# Patient Record
Sex: MALE | Race: OTHER | URBAN - METROPOLITAN AREA
[De-identification: names, ages, dates, MRNs, and addresses within clinical notes are randomized per-mention and may not be internally consistent; named-entity substitution may affect disease eponyms.]

---

## 2017-07-06 ENCOUNTER — EMERGENCY (EMERGENCY)
Facility: HOSPITAL | Age: 37
LOS: 1 days | Discharge: HOME | End: 2017-07-06
Admitting: EMERGENCY MEDICINE
Payer: SELF-PAY

## 2017-07-06 DIAGNOSIS — R11.2 NAUSEA WITH VOMITING, UNSPECIFIED: ICD-10-CM

## 2017-07-06 DIAGNOSIS — S83.206A UNSPECIFIED TEAR OF UNSPECIFIED MENISCUS, CURRENT INJURY, RIGHT KNEE, INITIAL ENCOUNTER: Chronic | ICD-10-CM

## 2017-07-06 DIAGNOSIS — K52.9 NONINFECTIVE GASTROENTERITIS AND COLITIS, UNSPECIFIED: ICD-10-CM

## 2017-07-06 PROCEDURE — 36415 COLL VENOUS BLD VENIPUNCTURE: CPT

## 2017-07-06 PROCEDURE — 85027 COMPLETE CBC AUTOMATED: CPT

## 2017-07-06 PROCEDURE — 80053 COMPREHEN METABOLIC PANEL: CPT

## 2017-07-06 PROCEDURE — 99284 EMERGENCY DEPT VISIT MOD MDM: CPT

## 2017-07-06 PROCEDURE — 96374 THER/PROPH/DIAG INJ IV PUSH: CPT

## 2017-07-06 PROCEDURE — 96375 TX/PRO/DX INJ NEW DRUG ADDON: CPT

## 2017-07-06 PROCEDURE — 83690 ASSAY OF LIPASE: CPT

## 2017-07-06 PROCEDURE — 99284 EMERGENCY DEPT VISIT MOD MDM: CPT | Mod: 25

## 2018-04-17 ENCOUNTER — INPATIENT (INPATIENT)
Facility: HOSPITAL | Age: 38
LOS: 2 days | Discharge: HOME | End: 2018-04-20
Attending: INTERNAL MEDICINE | Admitting: INTERNAL MEDICINE
Payer: COMMERCIAL

## 2018-04-17 VITALS
RESPIRATION RATE: 18 BRPM | HEART RATE: 95 BPM | DIASTOLIC BLOOD PRESSURE: 70 MMHG | TEMPERATURE: 99 F | OXYGEN SATURATION: 94 % | SYSTOLIC BLOOD PRESSURE: 122 MMHG

## 2018-04-17 DIAGNOSIS — S83.206A UNSPECIFIED TEAR OF UNSPECIFIED MENISCUS, CURRENT INJURY, RIGHT KNEE, INITIAL ENCOUNTER: Chronic | ICD-10-CM

## 2018-04-17 LAB
ALBUMIN SERPL ELPH-MCNC: 4.5 G/DL — SIGNIFICANT CHANGE UP (ref 3.3–5)
ALP SERPL-CCNC: 90 U/L — SIGNIFICANT CHANGE UP (ref 40–120)
ALT FLD-CCNC: 61 U/L — HIGH (ref 4–41)
AST SERPL-CCNC: 31 U/L — SIGNIFICANT CHANGE UP (ref 4–40)
BASE EXCESS BLDV CALC-SCNC: 0.5 MMOL/L — SIGNIFICANT CHANGE UP
BASOPHILS # BLD AUTO: 0.01 K/UL — SIGNIFICANT CHANGE UP (ref 0–0.2)
BASOPHILS NFR BLD AUTO: 0.1 % — SIGNIFICANT CHANGE UP (ref 0–2)
BILIRUB SERPL-MCNC: 0.6 MG/DL — SIGNIFICANT CHANGE UP (ref 0.2–1.2)
BUN SERPL-MCNC: 14 MG/DL — SIGNIFICANT CHANGE UP (ref 7–23)
CALCIUM SERPL-MCNC: 9.5 MG/DL — SIGNIFICANT CHANGE UP (ref 8.4–10.5)
CHLORIDE SERPL-SCNC: 100 MMOL/L — SIGNIFICANT CHANGE UP (ref 98–107)
CO2 SERPL-SCNC: 21 MMOL/L — LOW (ref 22–31)
CREAT SERPL-MCNC: 1.01 MG/DL — SIGNIFICANT CHANGE UP (ref 0.5–1.3)
EOSINOPHIL # BLD AUTO: 0 K/UL — SIGNIFICANT CHANGE UP (ref 0–0.5)
EOSINOPHIL NFR BLD AUTO: 0 % — SIGNIFICANT CHANGE UP (ref 0–6)
GLUCOSE SERPL-MCNC: 131 MG/DL — HIGH (ref 70–99)
HCO3 BLDV-SCNC: 24 MMOL/L — SIGNIFICANT CHANGE UP (ref 20–27)
HCT VFR BLD CALC: 50.1 % — HIGH (ref 39–50)
HGB BLD-MCNC: 16.9 G/DL — SIGNIFICANT CHANGE UP (ref 13–17)
IMM GRANULOCYTES # BLD AUTO: 0.05 # — SIGNIFICANT CHANGE UP
IMM GRANULOCYTES NFR BLD AUTO: 0.5 % — SIGNIFICANT CHANGE UP (ref 0–1.5)
LYMPHOCYTES # BLD AUTO: 0.64 K/UL — LOW (ref 1–3.3)
LYMPHOCYTES # BLD AUTO: 6.8 % — LOW (ref 13–44)
MCHC RBC-ENTMCNC: 30.7 PG — SIGNIFICANT CHANGE UP (ref 27–34)
MCHC RBC-ENTMCNC: 33.7 % — SIGNIFICANT CHANGE UP (ref 32–36)
MCV RBC AUTO: 90.9 FL — SIGNIFICANT CHANGE UP (ref 80–100)
MONOCYTES # BLD AUTO: 0.33 K/UL — SIGNIFICANT CHANGE UP (ref 0–0.9)
MONOCYTES NFR BLD AUTO: 3.5 % — SIGNIFICANT CHANGE UP (ref 2–14)
NEUTROPHILS # BLD AUTO: 8.34 K/UL — HIGH (ref 1.8–7.4)
NEUTROPHILS NFR BLD AUTO: 89.1 % — HIGH (ref 43–77)
NRBC # FLD: 0 — SIGNIFICANT CHANGE UP
PCO2 BLDV: 39 MMHG — LOW (ref 41–51)
PH BLDV: 7.42 PH — SIGNIFICANT CHANGE UP (ref 7.32–7.43)
PLATELET # BLD AUTO: 157 K/UL — SIGNIFICANT CHANGE UP (ref 150–400)
PMV BLD: 11.9 FL — SIGNIFICANT CHANGE UP (ref 7–13)
PO2 BLDV: 44 MMHG — HIGH (ref 35–40)
POTASSIUM SERPL-MCNC: 4 MMOL/L — SIGNIFICANT CHANGE UP (ref 3.5–5.3)
POTASSIUM SERPL-SCNC: 4 MMOL/L — SIGNIFICANT CHANGE UP (ref 3.5–5.3)
PROT SERPL-MCNC: 8.3 G/DL — SIGNIFICANT CHANGE UP (ref 6–8.3)
RBC # BLD: 5.51 M/UL — SIGNIFICANT CHANGE UP (ref 4.2–5.8)
RBC # FLD: 13 % — SIGNIFICANT CHANGE UP (ref 10.3–14.5)
SAO2 % BLDV: 78 % — SIGNIFICANT CHANGE UP (ref 60–85)
SODIUM SERPL-SCNC: 138 MMOL/L — SIGNIFICANT CHANGE UP (ref 135–145)
WBC # BLD: 9.37 K/UL — SIGNIFICANT CHANGE UP (ref 3.8–10.5)
WBC # FLD AUTO: 9.37 K/UL — SIGNIFICANT CHANGE UP (ref 3.8–10.5)

## 2018-04-17 PROCEDURE — 71046 X-RAY EXAM CHEST 2 VIEWS: CPT | Mod: 26

## 2018-04-17 RX ORDER — SODIUM CHLORIDE 9 MG/ML
1000 INJECTION INTRAMUSCULAR; INTRAVENOUS; SUBCUTANEOUS ONCE
Qty: 0 | Refills: 0 | Status: COMPLETED | OUTPATIENT
Start: 2018-04-17 | End: 2018-04-17

## 2018-04-17 RX ORDER — IPRATROPIUM/ALBUTEROL SULFATE 18-103MCG
3 AEROSOL WITH ADAPTER (GRAM) INHALATION ONCE
Qty: 0 | Refills: 0 | Status: COMPLETED | OUTPATIENT
Start: 2018-04-17 | End: 2018-04-17

## 2018-04-17 RX ORDER — NICOTINE POLACRILEX 2 MG
1 GUM BUCCAL DAILY
Qty: 0 | Refills: 0 | Status: DISCONTINUED | OUTPATIENT
Start: 2018-04-17 | End: 2018-04-20

## 2018-04-17 RX ORDER — AZITHROMYCIN 500 MG/1
500 TABLET, FILM COATED ORAL ONCE
Qty: 0 | Refills: 0 | Status: COMPLETED | OUTPATIENT
Start: 2018-04-17 | End: 2018-04-17

## 2018-04-17 RX ORDER — DEXAMETHASONE 0.5 MG/5ML
8 ELIXIR ORAL ONCE
Qty: 0 | Refills: 0 | Status: COMPLETED | OUTPATIENT
Start: 2018-04-17 | End: 2018-04-17

## 2018-04-17 RX ORDER — KETOROLAC TROMETHAMINE 30 MG/ML
15 SYRINGE (ML) INJECTION ONCE
Qty: 0 | Refills: 0 | Status: DISCONTINUED | OUTPATIENT
Start: 2018-04-17 | End: 2018-04-17

## 2018-04-17 RX ADMIN — Medication 100 MILLIGRAM(S): at 21:03

## 2018-04-17 RX ADMIN — Medication 15 MILLIGRAM(S): at 19:19

## 2018-04-17 RX ADMIN — Medication 1 PATCH: at 22:15

## 2018-04-17 RX ADMIN — Medication 3 MILLILITER(S): at 18:34

## 2018-04-17 RX ADMIN — Medication 15 MILLIGRAM(S): at 19:40

## 2018-04-17 RX ADMIN — Medication 8 MILLIGRAM(S): at 21:03

## 2018-04-17 RX ADMIN — SODIUM CHLORIDE 1000 MILLILITER(S): 9 INJECTION INTRAMUSCULAR; INTRAVENOUS; SUBCUTANEOUS at 19:19

## 2018-04-17 RX ADMIN — AZITHROMYCIN 500 MILLIGRAM(S): 500 TABLET, FILM COATED ORAL at 21:02

## 2018-04-17 NOTE — ED PROVIDER NOTE - RESPIRATORY NEGATIVE STATEMENT, MLM
no chest pain, no cough, and no shortness of breath. no chest pain, no cough, and +shortness of breath.

## 2018-04-17 NOTE — ED CDU PROVIDER INITIAL DAY NOTE - ATTENDING CONTRIBUTION TO CARE
I performed a face to face bedside interview with patient regarding history of present illness, review of symptoms and past medical history. I completed an independent physical exam.  I have discussed patient's plan of care.   I agree with note as stated above, having amended the EMR as needed to reflect my findings. I have discussed the assessment and plan of care.  This includes during the time I functioned as the attending physician for this patient.  Attending Contribution to Care: agree with plan of pa. pt p/w wheezing, uri. febrile. stable for cdu

## 2018-04-17 NOTE — ED ADULT TRIAGE NOTE - CHIEF COMPLAINT QUOTE
pt brought from [facility] c/o diff breathing x 3 days, hx asthma (no hx of intubation), dx w/ bronchitis today received prednisone zofran motrin by [facility], duo nebs by EMS, refusing nasal cannula on presentation, able to complete sentences, states feels much better, tmax @ home 103

## 2018-04-17 NOTE — ED CDU PROVIDER INITIAL DAY NOTE - OBJECTIVE STATEMENT
38 yo M pmhx of HTN ( no meds ), COPD (1 hospitalization, no intubations, albuterol PRN last use 1 mo ago) here for cough, congestion, fever, and wheezing x 2 days. pt reports went to UC this morning for symptoms and given nebs with little improvement, found to have fever of 103F and told to go to ER. Took motrin and fever resolved. Reports no albuterol at home (last use 1 mo ago). States "I typically just sign my self out when they tell me to stay overnight" however patient is ammendable to staying for obs. Denies CP HA dizziness neck pain abdominal pain rash weakness. Currently 2 ppd smoker.

## 2018-04-17 NOTE — ED PROVIDER NOTE - OBJECTIVE STATEMENT
38 y/o m with pmhx of chronic bronchitis, copd with hx of admissions never intubated p/w 36 y/o m with pmhx of chronic bronchitis, copd with hx of admissions never intubated p/w cough, sob. fever, 38 y/o m with pmhx of chronic bronchitis, copd with hx of admissions never intubated p/w cough, sob. fever x 2d.

## 2018-04-17 NOTE — ED CDU PROVIDER INITIAL DAY NOTE - MEDICAL DECISION MAKING DETAILS
36 yo M here for cough, congestion, fever, wheezing x a few days. improved in ED with nebs and steroids. labs reviewed, cxr clear. Plan for CDU for continued nebs, fluids, steroids, and azithro.

## 2018-04-17 NOTE — ED ADULT NURSE REASSESSMENT NOTE - NS ED NURSE REASSESS COMMENT FT1
rec'd pt. a&ox3, appears in NAD at this time, respirations unlabored, congested, speaks clearly and in full sentences. pt. with g20 saline lock on rt ac with no ss of infiltration. pt. reports breathing improved and pain is less at this time. meds given as ordered. pt. admitted to CDU. report given to BRENDAN Santiago in CDU.

## 2018-04-18 DIAGNOSIS — J44.1 CHRONIC OBSTRUCTIVE PULMONARY DISEASE WITH (ACUTE) EXACERBATION: ICD-10-CM

## 2018-04-18 LAB
B PERT DNA SPEC QL NAA+PROBE: SIGNIFICANT CHANGE UP
C PNEUM DNA SPEC QL NAA+PROBE: NOT DETECTED — SIGNIFICANT CHANGE UP
FLUAV H1 2009 PAND RNA SPEC QL NAA+PROBE: NOT DETECTED — SIGNIFICANT CHANGE UP
FLUAV H1 RNA SPEC QL NAA+PROBE: NOT DETECTED — SIGNIFICANT CHANGE UP
FLUAV H3 RNA SPEC QL NAA+PROBE: NOT DETECTED — SIGNIFICANT CHANGE UP
FLUAV SUBTYP SPEC NAA+PROBE: SIGNIFICANT CHANGE UP
FLUBV RNA SPEC QL NAA+PROBE: NOT DETECTED — SIGNIFICANT CHANGE UP
HADV DNA SPEC QL NAA+PROBE: NOT DETECTED — SIGNIFICANT CHANGE UP
HCOV 229E RNA SPEC QL NAA+PROBE: NOT DETECTED — SIGNIFICANT CHANGE UP
HCOV HKU1 RNA SPEC QL NAA+PROBE: NOT DETECTED — SIGNIFICANT CHANGE UP
HCOV NL63 RNA SPEC QL NAA+PROBE: NOT DETECTED — SIGNIFICANT CHANGE UP
HCOV OC43 RNA SPEC QL NAA+PROBE: NOT DETECTED — SIGNIFICANT CHANGE UP
HMPV RNA SPEC QL NAA+PROBE: POSITIVE — HIGH
HPIV1 RNA SPEC QL NAA+PROBE: NOT DETECTED — SIGNIFICANT CHANGE UP
HPIV2 RNA SPEC QL NAA+PROBE: NOT DETECTED — SIGNIFICANT CHANGE UP
HPIV3 RNA SPEC QL NAA+PROBE: NOT DETECTED — SIGNIFICANT CHANGE UP
HPIV4 RNA SPEC QL NAA+PROBE: NOT DETECTED — SIGNIFICANT CHANGE UP
M PNEUMO DNA SPEC QL NAA+PROBE: NOT DETECTED — SIGNIFICANT CHANGE UP
RSV RNA SPEC QL NAA+PROBE: NOT DETECTED — SIGNIFICANT CHANGE UP
RV+EV RNA SPEC QL NAA+PROBE: NOT DETECTED — SIGNIFICANT CHANGE UP

## 2018-04-18 PROCEDURE — 99223 1ST HOSP IP/OBS HIGH 75: CPT | Mod: GC

## 2018-04-18 RX ORDER — IBUPROFEN 200 MG
600 TABLET ORAL
Qty: 0 | Refills: 0 | Status: DISCONTINUED | OUTPATIENT
Start: 2018-04-18 | End: 2018-04-20

## 2018-04-18 RX ORDER — IPRATROPIUM/ALBUTEROL SULFATE 18-103MCG
3 AEROSOL WITH ADAPTER (GRAM) INHALATION ONCE
Qty: 0 | Refills: 0 | Status: COMPLETED | OUTPATIENT
Start: 2018-04-18 | End: 2018-04-18

## 2018-04-18 RX ORDER — IBUPROFEN 200 MG
400 TABLET ORAL
Qty: 0 | Refills: 0 | Status: DISCONTINUED | OUTPATIENT
Start: 2018-04-18 | End: 2018-04-18

## 2018-04-18 RX ORDER — ALBUTEROL 90 UG/1
2.5 AEROSOL, METERED ORAL EVERY 4 HOURS
Qty: 0 | Refills: 0 | Status: DISCONTINUED | OUTPATIENT
Start: 2018-04-18 | End: 2018-04-18

## 2018-04-18 RX ORDER — ALBUTEROL 90 UG/1
2 AEROSOL, METERED ORAL EVERY 6 HOURS
Qty: 0 | Refills: 0 | Status: DISCONTINUED | OUTPATIENT
Start: 2018-04-18 | End: 2018-04-19

## 2018-04-18 RX ORDER — IPRATROPIUM/ALBUTEROL SULFATE 18-103MCG
3 AEROSOL WITH ADAPTER (GRAM) INHALATION EVERY 6 HOURS
Qty: 0 | Refills: 0 | Status: DISCONTINUED | OUTPATIENT
Start: 2018-04-18 | End: 2018-04-19

## 2018-04-18 RX ORDER — ALBUTEROL 90 UG/1
2.5 AEROSOL, METERED ORAL ONCE
Qty: 0 | Refills: 0 | Status: COMPLETED | OUTPATIENT
Start: 2018-04-18 | End: 2018-04-18

## 2018-04-18 RX ADMIN — Medication 100 MILLIGRAM(S): at 22:31

## 2018-04-18 RX ADMIN — Medication 3 MILLILITER(S): at 07:08

## 2018-04-18 RX ADMIN — Medication 3 MILLILITER(S): at 06:57

## 2018-04-18 RX ADMIN — Medication 600 MILLIGRAM(S): at 18:14

## 2018-04-18 RX ADMIN — Medication 3 MILLILITER(S): at 22:11

## 2018-04-18 RX ADMIN — Medication 100 MILLIGRAM(S): at 18:13

## 2018-04-18 RX ADMIN — ALBUTEROL 2.5 MILLIGRAM(S): 90 AEROSOL, METERED ORAL at 06:41

## 2018-04-18 RX ADMIN — Medication 1 PATCH: at 08:29

## 2018-04-18 RX ADMIN — ALBUTEROL 2.5 MILLIGRAM(S): 90 AEROSOL, METERED ORAL at 13:14

## 2018-04-18 RX ADMIN — Medication 3 MILLILITER(S): at 06:33

## 2018-04-18 RX ADMIN — Medication 600 MILLIGRAM(S): at 19:00

## 2018-04-18 RX ADMIN — Medication 40 MILLIGRAM(S): at 13:14

## 2018-04-18 NOTE — H&P ADULT - NSHPPHYSICALEXAM_GEN_ALL_CORE
PHYSICAL EXAM:  General:  AAOx3  HEENT: EOMI, PERRL  Lymph Nodes: No LAD  Neck: JVP 4-6cm  Respiratory: diffuse end-expiratory wheezing  Cardiovascular: RRR, no m/r/g  Abdomen: soft, NT/ND, no HSM  Extremities: no c/c/e  Skin: nl. skin turgor  Neurological: no deficitis

## 2018-04-18 NOTE — ED CDU PROVIDER DISPOSITION NOTE - CLINICAL COURSE
38yo M hx of HTN, COPD, smoker, pw productive cough, sob, wheezing and fever x 2 days.  +SOB, +productive cough +subjective temp, chills. no chest pain. Using neb at home w  no relief. Denies any prior intubation or ICU admission. Hasn't follow up with pulmonary in couple years.   Vital signs noted. +audible expiratory wheezing bl no retractions. talking in full sentences. normal S1-S2 AOx3  plan admit to PCU, rvp, continue neb treatment, steroids, peak flow and IV abx,  pt agreeable to plan.

## 2018-04-18 NOTE — H&P ADULT - HISTORY OF PRESENT ILLNESS
36 y/o M with PMH of asthma presented to the hospital with chief complaint of shortness of breath.    Patient states, his sxs began 3 days prior to arrival.  He started having sinus congestion and cough with sputum production.  Denied fevers and sick contacts.    Re: h/o asthma and has not been on inhalers.  Saw Dr. MEL Avila 3 years ago but has been stable on no controller meds.  Smoker of 1 PPD for 15 years, but states he quit 2 days PTA.

## 2018-04-18 NOTE — H&P ADULT - ASSESSMENT
36 y/o M with PMH of asthma presented with acute exacerbation.    1.  Acute Asthma Exacerbation-  -Etiology appears related to a viral source given constellation of symptoms  -Clinically patient appears stable but wheezing on physical exam  -Given nebs and steroids in the ED  -Start Duonebs q4 ATC  -Medrol 40mg IV QD  -Cough suppressants  -Supplemental oxygen as needed  -Patient with no fevers/wbc count/cxr clear--no role for anti-microbials at this time    2. FEN-  -House diet    3. PPx-  -Lovenox  Ethan Soliz DO  Pulmonary and Critical Care Fellow

## 2018-04-18 NOTE — H&P ADULT - NSHPLABSRESULTS_GEN_ALL_CORE
LABS:                        16.9   9.37  )-----------( 157      ( 17 Apr 2018 19:25 )             50.1     04-17    138  |  100  |  14  ----------------------------<  131<H>  4.0   |  21<L>  |  1.01    Ca    9.5      17 Apr 2018 19:25    TPro  8.3  /  Alb  4.5  /  TBili  0.6  /  DBili  x   /  AST  31  /  ALT  61<H>  /  AlkPhos  90  04-17          Venous Blood Gas:  04-17 @ 19:25  7.42/39/44/24/78.0  VBG Lactate: --    RADIOLOGY:  [x] Reviewed and interpreted by me

## 2018-04-18 NOTE — SBIRT NOTE. - NSSBIRTSERVICES_GEN_A_ED_FT
Provided SBIRT services: Full screen Negative. Positive reinforcement provided given patient currently within healthy guidelines.   Audit Score: 0  DAST Score: 0  Duration = # 7 Minutes

## 2018-04-18 NOTE — H&P ADULT - NSHPREVIEWOFSYSTEMS_GEN_ALL_CORE
REVIEW OF SYSTEMS:  Constitutional:   Eyes:  ENT:  CV:  Resp: sob/cough  GI:  :  MSK:  Integumentary:  Neurological:  Psychiatric:  Endocrine:  Hematologic/Lymphatic:  Allergic/Immunologic:  [x] All other systems negative  [ ] Unable to assess ROS because ________

## 2018-04-18 NOTE — ED CDU PROVIDER SUBSEQUENT DAY NOTE - MEDICAL DECISION MAKING DETAILS
Pt is a 38 y/o M smoker PMHx HTN, COPD (pt states asthma or chronic bronchitis, no intubation, one hospitalization) p/w cough, fever, wheezing x 2 days -- bronchitis, copd exacerbation -- nebs, steroids, PCU admit

## 2018-04-18 NOTE — ED CDU PROVIDER SUBSEQUENT DAY NOTE - PROGRESS NOTE DETAILS
pt doing well, no further complaints, sleeping comfortable. Will continue to monitor for repeat nebs. Plan to d/c home with zpack. Pt now awake, coughing, reports "why did I stay overnight I want to sign out". Discussed with patient that he was being monitored overnight for wheezing or difficulty breathing. Pt reports that he wants to sign out of the unit. Offered patient nebulizer this morning as it has been a while since last neb as he was sleeping comfortable and did not require treatments overnight. Pt agrees for neb this morning. Will reassess. Pt admitted to PCU, Dr. LIsker.  Pt does not have a PMD at this time.

## 2018-04-18 NOTE — ED CDU PROVIDER SUBSEQUENT DAY NOTE - ATTENDING CONTRIBUTION TO CARE
Attending Statement: I have reviewed and agree with all pertinent clinical information, including history and physical exam and agree with treatment plan of the PA, except as noted.  38yo M hx of HTN, COPD, smoker, pw productive cough, sob, wheezing and fever x 2 days.  +SOB, +productive cough +subjective temp, chills. no chest pain. Using neb at home w  no relief. Denies any prior intubation or ICU admission. Hasn't follow up with pulmonary in couple years.   Vital signs noted. +audible expiratory wheezing bl no retractions. talking in full sentences. normal S1-S2 AOx3  plan admit to PCU, rvp, continue neb treatment, steroids, peak flow and IV abx,  pt agreeable to plan.

## 2018-04-18 NOTE — ED CDU PROVIDER SUBSEQUENT DAY NOTE - HISTORY
Pt is a 36 y/o M smoker PMHx HTN, COPD (pt states asthma or chronic bronchitis, no intubation, one hospitalization) p/w cough, fever, wheezing x 2 days.  Pt notes similar to COPD exacerbation in past.  Denies chest pain, numbness, weakness, syncope, palpitations.  In ED, pt with normal CXR.  Pt sent to CDU for bronchitis, copd exacerbation.  Presently, pt states symptoms not significantly improved.  At times, difficulty walking to bathroom.

## 2018-04-18 NOTE — ED CDU PROVIDER DISPOSITION NOTE - NS ED MD EM DAY 1
FINAL REPORT



EXAM:  XR CHEST ROUTINE 2V



HISTORY:  Shortness of breath 



TECHNIQUE:  PA and lateral views of the chest were submitted.



FINDINGS:  

The heart size is at the upper limits of normal. The lungs are

clear. Pleural fluid is not seen. The bones and soft tissues

appear normal.



IMPRESSION:  

No active chest disease. Observation Stay Multiple Days

## 2018-04-19 ENCOUNTER — TRANSCRIPTION ENCOUNTER (OUTPATIENT)
Age: 38
End: 2018-04-19

## 2018-04-19 DIAGNOSIS — J44.1 CHRONIC OBSTRUCTIVE PULMONARY DISEASE WITH (ACUTE) EXACERBATION: ICD-10-CM

## 2018-04-19 DIAGNOSIS — J41.0 SIMPLE CHRONIC BRONCHITIS: ICD-10-CM

## 2018-04-19 DIAGNOSIS — J45.21 MILD INTERMITTENT ASTHMA WITH (ACUTE) EXACERBATION: ICD-10-CM

## 2018-04-19 LAB
BUN SERPL-MCNC: 18 MG/DL — SIGNIFICANT CHANGE UP (ref 7–23)
CALCIUM SERPL-MCNC: 8.5 MG/DL — SIGNIFICANT CHANGE UP (ref 8.4–10.5)
CHLORIDE SERPL-SCNC: 106 MMOL/L — SIGNIFICANT CHANGE UP (ref 98–107)
CO2 SERPL-SCNC: 23 MMOL/L — SIGNIFICANT CHANGE UP (ref 22–31)
CREAT SERPL-MCNC: 0.79 MG/DL — SIGNIFICANT CHANGE UP (ref 0.5–1.3)
GLUCOSE SERPL-MCNC: 119 MG/DL — HIGH (ref 70–99)
HCT VFR BLD CALC: 45.4 % — SIGNIFICANT CHANGE UP (ref 39–50)
HGB BLD-MCNC: 14.9 G/DL — SIGNIFICANT CHANGE UP (ref 13–17)
MCHC RBC-ENTMCNC: 30.2 PG — SIGNIFICANT CHANGE UP (ref 27–34)
MCHC RBC-ENTMCNC: 32.8 % — SIGNIFICANT CHANGE UP (ref 32–36)
MCV RBC AUTO: 92.1 FL — SIGNIFICANT CHANGE UP (ref 80–100)
NRBC # FLD: 0 — SIGNIFICANT CHANGE UP
PLATELET # BLD AUTO: 166 K/UL — SIGNIFICANT CHANGE UP (ref 150–400)
PMV BLD: 11.9 FL — SIGNIFICANT CHANGE UP (ref 7–13)
POTASSIUM SERPL-MCNC: 3.8 MMOL/L — SIGNIFICANT CHANGE UP (ref 3.5–5.3)
POTASSIUM SERPL-SCNC: 3.8 MMOL/L — SIGNIFICANT CHANGE UP (ref 3.5–5.3)
RBC # BLD: 4.93 M/UL — SIGNIFICANT CHANGE UP (ref 4.2–5.8)
RBC # FLD: 13.3 % — SIGNIFICANT CHANGE UP (ref 10.3–14.5)
SODIUM SERPL-SCNC: 141 MMOL/L — SIGNIFICANT CHANGE UP (ref 135–145)
WBC # BLD: 14.27 K/UL — HIGH (ref 3.8–10.5)
WBC # FLD AUTO: 14.27 K/UL — HIGH (ref 3.8–10.5)

## 2018-04-19 PROCEDURE — 99233 SBSQ HOSP IP/OBS HIGH 50: CPT | Mod: GC

## 2018-04-19 RX ORDER — IPRATROPIUM/ALBUTEROL SULFATE 18-103MCG
3 AEROSOL WITH ADAPTER (GRAM) INHALATION EVERY 4 HOURS
Qty: 0 | Refills: 0 | Status: DISCONTINUED | OUTPATIENT
Start: 2018-04-19 | End: 2018-04-20

## 2018-04-19 RX ORDER — ALBUTEROL 90 UG/1
1 AEROSOL, METERED ORAL EVERY 4 HOURS
Qty: 0 | Refills: 0 | Status: DISCONTINUED | OUTPATIENT
Start: 2018-04-19 | End: 2018-04-19

## 2018-04-19 RX ORDER — ALBUTEROL 90 UG/1
2 AEROSOL, METERED ORAL EVERY 4 HOURS
Qty: 0 | Refills: 0 | Status: DISCONTINUED | OUTPATIENT
Start: 2018-04-19 | End: 2018-04-20

## 2018-04-19 RX ADMIN — Medication 100 MILLIGRAM(S): at 08:05

## 2018-04-19 RX ADMIN — Medication 100 MILLIGRAM(S): at 22:54

## 2018-04-19 RX ADMIN — Medication 40 MILLIGRAM(S): at 12:13

## 2018-04-19 RX ADMIN — Medication 100 MILLIGRAM(S): at 06:02

## 2018-04-19 RX ADMIN — ALBUTEROL 2 PUFF(S): 90 AEROSOL, METERED ORAL at 00:48

## 2018-04-19 RX ADMIN — Medication 3 MILLILITER(S): at 13:40

## 2018-04-19 RX ADMIN — Medication 600 MILLIGRAM(S): at 09:00

## 2018-04-19 RX ADMIN — Medication 100 MILLIGRAM(S): at 01:16

## 2018-04-19 RX ADMIN — Medication 1 PATCH: at 09:05

## 2018-04-19 RX ADMIN — Medication 100 MILLIGRAM(S): at 15:05

## 2018-04-19 RX ADMIN — Medication 40 MILLIGRAM(S): at 06:02

## 2018-04-19 RX ADMIN — ALBUTEROL 2 PUFF(S): 90 AEROSOL, METERED ORAL at 17:10

## 2018-04-19 RX ADMIN — Medication 3 MILLILITER(S): at 22:30

## 2018-04-19 RX ADMIN — Medication 1 PATCH: at 09:06

## 2018-04-19 RX ADMIN — Medication 3 MILLILITER(S): at 09:38

## 2018-04-19 RX ADMIN — Medication 600 MILLIGRAM(S): at 08:05

## 2018-04-19 NOTE — DISCHARGE NOTE ADULT - MEDICATION SUMMARY - MEDICATIONS TO TAKE
I will START or STAY ON the medications listed below when I get home from the hospital:    predniSONE 20 mg oral tablet  -- 2 tab(s) by mouth once a day MDD:2  -- It is very important that you take or use this exactly as directed.  Do not skip doses or discontinue unless directed by your doctor.  Obtain medical advice before taking any non-prescription drugs as some may affect the action of this medication.  Take with food or milk.    -- Indication: For Chronic obstructive pulmonary disease with acute exacerbation    Advair Diskus 500 mcg-50 mcg inhalation powder  -- 1 puff(s) inhaled 2 times a day MDD:2  -- Check with your doctor before becoming pregnant.  For inhalation only.  Obtain medical advice before taking any non-prescription drugs as some may affect the action of this medication.  Rinse mouth thoroughly after use.    -- Indication: For Chronic obstructive pulmonary disease with acute exacerbation    Proventil HFA 90 mcg/inh inhalation aerosol  -- 2 puff(s) inhaled every 4 hours MDD:12 - as needed for shortness of breath or wheezing  -- For inhalation only.  It is very important that you take or use this exactly as directed.  Do not skip doses or discontinue unless directed by your doctor.  Obtain medical advice before taking any non-prescription drugs as some may affect the action of this medication.  Shake well before use.    -- Indication: For Chronic obstructive pulmonary disease with acute exacerbation    albuterol 90 mcg/inh inhalation aerosol  -- 2 puff(s) inhaled every 4 hours, As needed, Shortness of Breath and/or Wheezing MDD:12  -- Indication: For Mild intermittent asthma with acute exacerbation    sucralfate 1 g oral tablet  -- 1 tab(s) by mouth 4 times a day, one hour before meals and bedtime  -- Do not take dairy products, antacids, or iron preparations within one hour of this medication.  It is very important that you take or use this exactly as directed.  Do not skip doses or discontinue unless directed by your doctor.  Take medication on an empty stomach 1 hour before or 2 to 3 hours after a meal unless otherwise directed by your doctor.      -- Indication: For GERD    omeprazole 40 mg oral delayed release capsule  -- 1 cap(s) by mouth once a day  -- It is very important that you take or use this exactly as directed.  Do not skip doses or discontinue unless directed by your doctor.  Obtain medical advice before taking any non-prescription drugs as some may affect the action of this medication.  Swallow whole.  Do not crush.      -- Indication: For GERD    nicotine 21 mg/24 hr transdermal film, extended release  -- 1 patch by transdermal patch once a day MDD:1  -- Indication: For nicotine addiction

## 2018-04-19 NOTE — DISCHARGE NOTE ADULT - PLAN OF CARE
Prevent and treat exacerbations. Reduce mortality. To be infection free prednisone 40mg po daily x 5 days total  advair 500/50 2 puffs twice daily   proventil 2 puffs every four hours as needed for shortness of breath or wheezing  nicotine patch to aid in smoking cessation  Appt with Dr. Hayes at the pulm clinic  5/7 930am with the pulmonologist c/w treatment above

## 2018-04-19 NOTE — PROGRESS NOTE ADULT - SUBJECTIVE AND OBJECTIVE BOX
CHIEF COMPLAINT:    Interval Events: no acute events overnight    REVIEW OF SYSTEMS:  "feels lousy and achy"  Eyes: denies  CV: denies chest pain, palpitations  Resp:  complaining of wheezing   GI: denies any abdominal pain  : denies any problems  MSK: full ROM  Integumentary: denies any rash   Neurological: denies any loss of memory  Psychiatric: denies    Hematologic/Lymphatic: denies any bleeding issues     [x ] All other systems negative  [ ] Unable to assess ROS because ________    OBJECTIVE:  ICU Vital Signs Last 24 Hrs  T(C): 36.5 (19 Apr 2018 12:12), Max: 36.5 (18 Apr 2018 22:20)  T(F): 97.7 (19 Apr 2018 12:12), Max: 97.7 (18 Apr 2018 22:20)  HR: 67 (19 Apr 2018 12:12) (62 - 102)  BP: 124/62 (19 Apr 2018 12:12) (124/62 - 146/81)  BP(mean): --  ABP: --  ABP(mean): --  RR: 18 (19 Apr 2018 12:12) (18 - 20)  SpO2: 97% (19 Apr 2018 12:12) (92% - 97%)        CAPILLARY BLOOD GLUCOSE    PHYSICAL EXAM:  GENERAL: NAD, well-developed  HEAD:  Atraumatic, Normocephalic  EYES: EOMI, PERRLA, conjunctiva and sclera clear  NECK: Supple, No JVD  CHEST/LUNG: Clear to auscultation bilaterally; +wheezing  HEART: Regular rate and rhythm; No murmurs, rubs, or gallops  ABDOMEN: Soft, Nontender, Nondistended; Bowel sounds present  EXTREMITIES:  2+ Peripheral Pulses, No clubbing, cyanosis, or edema  PSYCH: AAOx3  NEUROLOGY: non-focal  SKIN: No rashes or lesions          HOSPITAL MEDICATIONS:  MEDICATIONS  (STANDING):  ALBUTerol/ipratropium for Nebulization 3 milliLiter(s) Nebulizer every 4 hours  benzonatate 100 milliGRAM(s) Oral every 8 hours  nicotine - 21 mG/24Hr(s) Patch 1 patch Transdermal daily    MEDICATIONS  (PRN):  ALBUTerol    90 MICROgram(s) HFA Inhaler 2 Puff(s) Inhalation every 4 hours PRN Shortness of Breath and/or Wheezing  guaiFENesin    Syrup 100 milliGRAM(s) Oral every 6 hours PRN Cough  ibuprofen  Tablet 600 milliGRAM(s) Oral four times a day PRN moderate and severe pain      LABS:                        14.9   14.27 )-----------( 166      ( 19 Apr 2018 06:08 )             45.4     04-19    141  |  106  |  18  ----------------------------<  119<H>  3.8   |  23  |  0.79    Ca    8.5      19 Apr 2018 06:08    TPro  8.3  /  Alb  4.5  /  TBili  0.6  /  DBili  x   /  AST  31  /  ALT  61<H>  /  AlkPhos  90  04-17          Venous Blood Gas:  04-17 @ 19:25  7.42/39/44/24/78.0  VBG Lactate: --      MICROBIOLOGY:     RADIOLOGY:  [ ] Reviewed and interpreted by me    PULMONARY FUNCTION TESTS: CHIEF COMPLAINT:  asthma exacerbation    Interval Events: no acute events overnight    REVIEW OF SYSTEMS:  "feels lousy and achy"  Eyes: denies  CV: denies chest pain, palpitations  Resp:  complaining of wheezing   GI: denies any abdominal pain  : denies any problems  MSK: full ROM  Integumentary: denies any rash   Neurological: denies any loss of memory  Psychiatric: denies    Hematologic/Lymphatic: denies any bleeding issues     [x ] All other systems negative  [ ] Unable to assess ROS because ________    OBJECTIVE:  ICU Vital Signs Last 24 Hrs  T(C): 36.5 (19 Apr 2018 12:12), Max: 36.5 (18 Apr 2018 22:20)  T(F): 97.7 (19 Apr 2018 12:12), Max: 97.7 (18 Apr 2018 22:20)  HR: 67 (19 Apr 2018 12:12) (62 - 102)  BP: 124/62 (19 Apr 2018 12:12) (124/62 - 146/81)  BP(mean): --  ABP: --  ABP(mean): --  RR: 18 (19 Apr 2018 12:12) (18 - 20)  SpO2: 97% (19 Apr 2018 12:12) (92% - 97%)        CAPILLARY BLOOD GLUCOSE    PHYSICAL EXAM:  GENERAL: NAD, well-developed  HEAD:  Atraumatic, Normocephalic  EYES: EOMI, PERRLA, conjunctiva and sclera clear  NECK: Supple, No JVD  CHEST/LUNG: Clear to auscultation bilaterally; +wheezing  HEART: Regular rate and rhythm; No murmurs, rubs, or gallops  ABDOMEN: Soft, Nontender, Nondistended; Bowel sounds present  EXTREMITIES:  2+ Peripheral Pulses, No clubbing, cyanosis, or edema  PSYCH: AAOx3  NEUROLOGY: non-focal  SKIN: No rashes or lesions          HOSPITAL MEDICATIONS:  MEDICATIONS  (STANDING):  ALBUTerol/ipratropium for Nebulization 3 milliLiter(s) Nebulizer every 4 hours  benzonatate 100 milliGRAM(s) Oral every 8 hours  nicotine - 21 mG/24Hr(s) Patch 1 patch Transdermal daily    MEDICATIONS  (PRN):  ALBUTerol    90 MICROgram(s) HFA Inhaler 2 Puff(s) Inhalation every 4 hours PRN Shortness of Breath and/or Wheezing  guaiFENesin    Syrup 100 milliGRAM(s) Oral every 6 hours PRN Cough  ibuprofen  Tablet 600 milliGRAM(s) Oral four times a day PRN moderate and severe pain      LABS:                        14.9   14.27 )-----------( 166      ( 19 Apr 2018 06:08 )             45.4     04-19    141  |  106  |  18  ----------------------------<  119<H>  3.8   |  23  |  0.79    Ca    8.5      19 Apr 2018 06:08    TPro  8.3  /  Alb  4.5  /  TBili  0.6  /  DBili  x   /  AST  31  /  ALT  61<H>  /  AlkPhos  90  04-17          Venous Blood Gas:  04-17 @ 19:25  7.42/39/44/24/78.0  VBG Lactate: --      MICROBIOLOGY:     RADIOLOGY:  [ ] Reviewed and interpreted by me    PULMONARY FUNCTION TESTS:

## 2018-04-19 NOTE — DISCHARGE NOTE ADULT - PATIENT PORTAL LINK FT
You can access the [facility] Patient Portal, offered by [facility], by registering with the following website: [email or web address]

## 2018-04-19 NOTE — DISCHARGE NOTE ADULT - CARE PROVIDER_API CALL
Lisker, Gita N (MD), Medicine  Pulmonary Medicine  [address] [place] [address]  Phone: [phone]  Fax: [phone] Lisker, Gita N (MD), Medicine  Pulmonary Medicine  [address] [place] [address]  Phone: [phone]  Fax: [phone]    Shazia Hayes), Critical Care Medicine; Internal Medicine; Pulmonary Disease  [address] [place] [address]  Phone: [phone]  Fax: [phone]

## 2018-04-19 NOTE — DISCHARGE NOTE ADULT - HOSPITAL COURSE
36 y/o M with PMH of asthma presented with acute exacerbation.   Asthma Exacerbation-  -Etiology appears related to a viral source given constellation of symptoms  -Clinically patient appears stable but wheezing on physical exam  -Given nebs and steroids in the ED  -Start Duonebs q4 ATC  -Medrol 40mg IV QD  -Cough suppressants  -Supplemental oxygen as needed  -Patient with no fevers/wbc count/cxr clear--no role for anti-microbials at this time    2. FEN-  -House diet    3. PPx-  -Lovenox 38 y/o M with PMH of asthma presented with acute exacerbation.   Asthma Exacerbation-  -Etiology appears related to a viral source given constellation of symptoms  -Clinically patient appears stable but wheezing on physical exam  -Given nebs and steroids in the ED  -Start Duonebs q4 ATC  -Treated with Medrol 40mg IV BID - changed to po prednisone 40mg daily x 5 days   -Cough suppressants  -Supplemental oxygen as needed  -Patient with no fevers/wbc count/cxr clear--no role for anti-microbials at this time    2. FEN-  -House diet    3. PPx-  -Lovenox

## 2018-04-19 NOTE — DISCHARGE NOTE ADULT - CARE PROVIDERS DIRECT ADDRESSES
[email or web address]

## 2018-04-19 NOTE — PROGRESS NOTE ADULT - PROBLEM SELECTOR PLAN 1
-Duonebs q4 ATC  -Medrol 40mg IV QD  -Cough suppressants  -Supplemental oxygen as needed  -Patient with no fevers/wbc count/cxr clear--no role for anti-microbials at this time -still sob, still with extensive b/l exp wheeze  -increased Duonebs q4 ATC  -Medrol 40mg IV BID  -Cough suppressants  -Supplemental oxygen as needed  -Patient with no fevers/wbc count/cxr clear--no role for anti-microbials at this time

## 2018-04-19 NOTE — DISCHARGE NOTE ADULT - CARE PLAN
Principal Discharge DX:	COPD exacerbation  Goal:	Prevent and treat exacerbations. Reduce mortality.  Secondary Diagnosis:	Mild intermittent asthma with acute exacerbation  Goal:	Prevent and treat exacerbations. Reduce mortality.  Secondary Diagnosis:	Simple chronic bronchitis  Goal:	To be infection free Principal Discharge DX:	COPD exacerbation  Goal:	Prevent and treat exacerbations. Reduce mortality.  Assessment and plan of treatment:	prednisone 40mg po daily x 5 days total  advair 500/50 2 puffs twice daily   proventil 2 puffs every four hours as needed for shortness of breath or wheezing  nicotine patch to aid in smoking cessation  Appt with Dr. Hayes at the pulm clinic  5/7 930am with the pulmonologist  Secondary Diagnosis:	Mild intermittent asthma with acute exacerbation  Goal:	Prevent and treat exacerbations. Reduce mortality.  Assessment and plan of treatment:	c/w treatment above  Secondary Diagnosis:	Simple chronic bronchitis  Goal:	To be infection free

## 2018-04-19 NOTE — PROGRESS NOTE ADULT - PROBLEM SELECTOR PLAN 2
-Duonebs q4 ATC  -Medrol 40mg IV QD  -Cough suppressants  -Supplemental oxygen as needed  -Patient with no fevers/wbc count/cxr clear--no role for anti-microbials at this time -Duonebs q4 ATC  -prn albuterol q4h  -Medrol 40mg IV BID  -Cough suppressants  -Supplemental oxygen as needed  -Patient with no fevers/wbc count/cxr clear--no role for anti-microbials at this time

## 2018-04-20 VITALS — OXYGEN SATURATION: 98 %

## 2018-04-20 LAB
HCT VFR BLD CALC: 44.9 % — SIGNIFICANT CHANGE UP (ref 39–50)
HGB BLD-MCNC: 15.1 G/DL — SIGNIFICANT CHANGE UP (ref 13–17)
MCHC RBC-ENTMCNC: 30.7 PG — SIGNIFICANT CHANGE UP (ref 27–34)
MCHC RBC-ENTMCNC: 33.6 % — SIGNIFICANT CHANGE UP (ref 32–36)
MCV RBC AUTO: 91.3 FL — SIGNIFICANT CHANGE UP (ref 80–100)
NRBC # FLD: 0 — SIGNIFICANT CHANGE UP
PLATELET # BLD AUTO: 170 K/UL — SIGNIFICANT CHANGE UP (ref 150–400)
PMV BLD: 11.9 FL — SIGNIFICANT CHANGE UP (ref 7–13)
RBC # BLD: 4.92 M/UL — SIGNIFICANT CHANGE UP (ref 4.2–5.8)
RBC # FLD: 13.5 % — SIGNIFICANT CHANGE UP (ref 10.3–14.5)
WBC # BLD: 12.46 K/UL — HIGH (ref 3.8–10.5)
WBC # FLD AUTO: 12.46 K/UL — HIGH (ref 3.8–10.5)

## 2018-04-20 PROCEDURE — 99238 HOSP IP/OBS DSCHRG MGMT 30/<: CPT

## 2018-04-20 RX ORDER — ALBUTEROL 90 UG/1
2 AEROSOL, METERED ORAL
Qty: 1 | Refills: 0 | OUTPATIENT
Start: 2018-04-20 | End: 2018-05-19

## 2018-04-20 RX ORDER — FLUTICASONE PROPIONATE AND SALMETEROL 50; 250 UG/1; UG/1
1 POWDER ORAL; RESPIRATORY (INHALATION)
Qty: 1 | Refills: 0 | OUTPATIENT
Start: 2018-04-20 | End: 2018-05-19

## 2018-04-20 RX ORDER — ALBUTEROL 90 UG/1
2 AEROSOL, METERED ORAL
Qty: 1 | Refills: 0 | OUTPATIENT
Start: 2018-04-20 | End: 2018-05-03

## 2018-04-20 RX ORDER — NICOTINE POLACRILEX 2 MG
1 GUM BUCCAL
Qty: 1 | Refills: 0 | OUTPATIENT
Start: 2018-04-20 | End: 2018-05-01

## 2018-04-20 RX ORDER — DOCUSATE SODIUM 100 MG
100 CAPSULE ORAL DAILY
Qty: 0 | Refills: 0 | Status: DISCONTINUED | OUTPATIENT
Start: 2018-04-20 | End: 2018-04-20

## 2018-04-20 RX ORDER — DOCUSATE SODIUM 100 MG
100 CAPSULE ORAL ONCE
Qty: 0 | Refills: 0 | Status: COMPLETED | OUTPATIENT
Start: 2018-04-20 | End: 2018-04-20

## 2018-04-20 RX ORDER — SENNA PLUS 8.6 MG/1
2 TABLET ORAL AT BEDTIME
Qty: 0 | Refills: 0 | Status: DISCONTINUED | OUTPATIENT
Start: 2018-04-20 | End: 2018-04-20

## 2018-04-20 RX ADMIN — Medication 600 MILLIGRAM(S): at 11:00

## 2018-04-20 RX ADMIN — Medication 100 MILLIGRAM(S): at 06:40

## 2018-04-20 RX ADMIN — Medication 1 PATCH: at 10:19

## 2018-04-20 RX ADMIN — Medication 1 PATCH: at 10:18

## 2018-04-20 RX ADMIN — Medication 100 MILLIGRAM(S): at 05:32

## 2018-04-20 RX ADMIN — Medication 3 MILLILITER(S): at 03:30

## 2018-04-20 RX ADMIN — Medication 3 MILLILITER(S): at 09:51

## 2018-04-20 RX ADMIN — Medication 600 MILLIGRAM(S): at 10:16

## 2018-04-20 RX ADMIN — Medication 40 MILLIGRAM(S): at 05:32

## 2018-04-20 NOTE — PROGRESS NOTE ADULT - SUBJECTIVE AND OBJECTIVE BOX
CHIEF COMPLAINT:    Interval Events:    REVIEW OF SYSTEMS:  Constitutional:   Eyes:  ENT:  CV:  Resp:  GI:  :  MSK:  Integumentary:  Neurological:  Psychiatric:  Endocrine:  Hematologic/Lymphatic:  Allergic/Immunologic:  [ ] All other systems negative  [ ] Unable to assess ROS because ________    OBJECTIVE:  ICU Vital Signs Last 24 Hrs  T(C): 36.9 (20 Apr 2018 05:28), Max: 36.9 (20 Apr 2018 05:28)  T(F): 98.5 (20 Apr 2018 05:28), Max: 98.5 (20 Apr 2018 05:28)  HR: 71 (20 Apr 2018 05:28) (67 - 78)  BP: 125/71 (20 Apr 2018 05:28) (118/64 - 125/71)  BP(mean): --  ABP: --  ABP(mean): --  RR: 18 (20 Apr 2018 05:28) (16 - 18)  SpO2: 93% (20 Apr 2018 05:28) (93% - 97%)        CAPILLARY BLOOD GLUCOSE          PHYSICAL EXAM:  General:   HEENT:   Lymph Nodes:  Neck:   Respiratory:   Cardiovascular:   Abdomen:   Extremities:   Skin:   Neurological:  Psychiatry:    HOSPITAL MEDICATIONS:  MEDICATIONS  (STANDING):  ALBUTerol/ipratropium for Nebulization 3 milliLiter(s) Nebulizer every 4 hours  benzonatate 100 milliGRAM(s) Oral every 8 hours  docusate sodium 100 milliGRAM(s) Oral daily  methylPREDNISolone sodium succinate Injectable 40 milliGRAM(s) IV Push two times a day  nicotine - 21 mG/24Hr(s) Patch 1 patch Transdermal daily  senna 2 Tablet(s) Oral at bedtime    MEDICATIONS  (PRN):  ALBUTerol    90 MICROgram(s) HFA Inhaler 2 Puff(s) Inhalation every 4 hours PRN Shortness of Breath and/or Wheezing  guaiFENesin    Syrup 100 milliGRAM(s) Oral every 6 hours PRN Cough  ibuprofen  Tablet 600 milliGRAM(s) Oral four times a day PRN moderate and severe pain      LABS:                        15.1   12.46 )-----------( 170      ( 20 Apr 2018 05:30 )             44.9     04-19    141  |  106  |  18  ----------------------------<  119<H>  3.8   |  23  |  0.79    Ca    8.5      19 Apr 2018 06:08                MICROBIOLOGY:     RADIOLOGY:  [ ] Reviewed and interpreted by me    PULMONARY FUNCTION TESTS:    EKG: CHIEF COMPLAINT:    Interval Events:      REVIEW OF SYSTEMS:  "still feels lousy and achy"  Eyes: denies  CV: denies chest pain, palpitations  Resp:  complaining of wheezing   GI: denies any abdominal pain  : denies any problems  MSK: full ROM  Integumentary: denies any rash   Neurological: denies any loss of memory  Psychiatric: denies  Hematologic/Lymphatic: denies any bleeding issues     [x ] All other systems negative  [ ] Unable to assess ROS because ________    OBJECTIVE:  ICU Vital Signs Last 24 Hrs  T(C): 36.9 (20 Apr 2018 05:28), Max: 36.9 (20 Apr 2018 05:28)  T(F): 98.5 (20 Apr 2018 05:28), Max: 98.5 (20 Apr 2018 05:28)  HR: 71 (20 Apr 2018 05:28) (67 - 78)  BP: 125/71 (20 Apr 2018 05:28) (118/64 - 125/71)  BP(mean): --  ABP: --  ABP(mean): --  RR: 18 (20 Apr 2018 05:28) (16 - 18)  SpO2: 93% (20 Apr 2018 05:28) (93% - 97%)        CAPILLARY BLOOD GLUCOSE        PHYSICAL EXAM:  GENERAL: NAD, well-developed  HEAD:  Atraumatic, Normocephalic  EYES: EOMI, PERRLA, conjunctiva and sclera clear  NECK: Supple, No JVD  CHEST/LUNG: Clear to auscultation bilaterally; +wheezing but improved since yesterday  HEART: Regular rate and rhythm; No murmurs, rubs, or gallops  ABDOMEN: Soft, Nontender, Nondistended; Bowel sounds present  EXTREMITIES:  2+ Peripheral Pulses, No clubbing, cyanosis, or edema  PSYCH: AAOx3  NEUROLOGY: non-focal  SKIN: No rashes or lesions      HOSPITAL MEDICATIONS:  MEDICATIONS  (STANDING):  ALBUTerol/ipratropium for Nebulization 3 milliLiter(s) Nebulizer every 4 hours  benzonatate 100 milliGRAM(s) Oral every 8 hours  docusate sodium 100 milliGRAM(s) Oral daily  methylPREDNISolone sodium succinate Injectable 40 milliGRAM(s) IV Push two times a day  nicotine - 21 mG/24Hr(s) Patch 1 patch Transdermal daily  senna 2 Tablet(s) Oral at bedtime    MEDICATIONS  (PRN):  ALBUTerol    90 MICROgram(s) HFA Inhaler 2 Puff(s) Inhalation every 4 hours PRN Shortness of Breath and/or Wheezing  guaiFENesin    Syrup 100 milliGRAM(s) Oral every 6 hours PRN Cough  ibuprofen  Tablet 600 milliGRAM(s) Oral four times a day PRN moderate and severe pain      LABS:                        15.1   12.46 )-----------( 170      ( 20 Apr 2018 05:30 )             44.9     04-19    141  |  106  |  18  ----------------------------<  119<H>  3.8   |  23  |  0.79    Ca    8.5      19 Apr 2018 06:08            MICROBIOLOGY:     RADIOLOGY:  [ ] Reviewed and interpreted by me    PULMONARY FUNCTION TESTS:    EKG: CHIEF COMPLAINT:    Interval Events:      REVIEW OF SYSTEMS:  "feel much better"  Eyes: denies  CV: denies chest pain, palpitations  Resp:  complaining of wheezing   GI: denies any abdominal pain  : denies any problems  MSK: full ROM  Integumentary: denies any rash   Neurological: denies any loss of memory  Psychiatric: denies  Hematologic/Lymphatic: denies any bleeding issues     [x ] All other systems negative  [ ] Unable to assess ROS because ________    OBJECTIVE:  ICU Vital Signs Last 24 Hrs  T(C): 36.9 (20 Apr 2018 05:28), Max: 36.9 (20 Apr 2018 05:28)  T(F): 98.5 (20 Apr 2018 05:28), Max: 98.5 (20 Apr 2018 05:28)  HR: 71 (20 Apr 2018 05:28) (67 - 78)  BP: 125/71 (20 Apr 2018 05:28) (118/64 - 125/71)  BP(mean): --  ABP: --  ABP(mean): --  RR: 18 (20 Apr 2018 05:28) (16 - 18)  SpO2: 93% (20 Apr 2018 05:28) (93% - 97%)        CAPILLARY BLOOD GLUCOSE        PHYSICAL EXAM:  GENERAL: NAD, well-developed  HEAD:  Atraumatic, Normocephalic  EYES: EOMI, PERRLA, conjunctiva and sclera clear  NECK: Supple, No JVD  CHEST/LUNG: Clear to auscultation bilaterally; +wheezing but improved since yesterday  HEART: Regular rate and rhythm; No murmurs, rubs, or gallops  ABDOMEN: Soft, Nontender, Nondistended; Bowel sounds present  EXTREMITIES:  2+ Peripheral Pulses, No clubbing, cyanosis, or edema  PSYCH: AAOx3  NEUROLOGY: non-focal  SKIN: No rashes or lesions      HOSPITAL MEDICATIONS:  MEDICATIONS  (STANDING):  ALBUTerol/ipratropium for Nebulization 3 milliLiter(s) Nebulizer every 4 hours  benzonatate 100 milliGRAM(s) Oral every 8 hours  docusate sodium 100 milliGRAM(s) Oral daily  methylPREDNISolone sodium succinate Injectable 40 milliGRAM(s) IV Push two times a day  nicotine - 21 mG/24Hr(s) Patch 1 patch Transdermal daily  senna 2 Tablet(s) Oral at bedtime    MEDICATIONS  (PRN):  ALBUTerol    90 MICROgram(s) HFA Inhaler 2 Puff(s) Inhalation every 4 hours PRN Shortness of Breath and/or Wheezing  guaiFENesin    Syrup 100 milliGRAM(s) Oral every 6 hours PRN Cough  ibuprofen  Tablet 600 milliGRAM(s) Oral four times a day PRN moderate and severe pain      LABS:                        15.1   12.46 )-----------( 170      ( 20 Apr 2018 05:30 )             44.9     04-19    141  |  106  |  18  ----------------------------<  119<H>  3.8   |  23  |  0.79    Ca    8.5      19 Apr 2018 06:08            MICROBIOLOGY:     RADIOLOGY:  [ ] Reviewed and interpreted by me    PULMONARY FUNCTION TESTS:    EKG: CHIEF COMPLAINT:  acute asthma exacerbation, SOB    Interval Events:  no acute events overnight      REVIEW OF SYSTEMS:  "feel much better"  Eyes: denies  CV: denies chest pain, palpitations  Resp:  complaining of wheezing   GI: denies any abdominal pain  : denies any problems  MSK: full ROM  Integumentary: denies any rash   Neurological: denies any loss of memory  Psychiatric: denies  Hematologic/Lymphatic: denies any bleeding issues     [x ] All other systems negative  [ ] Unable to assess ROS because ________    OBJECTIVE:  ICU Vital Signs Last 24 Hrs  T(C): 36.9 (20 Apr 2018 05:28), Max: 36.9 (20 Apr 2018 05:28)  T(F): 98.5 (20 Apr 2018 05:28), Max: 98.5 (20 Apr 2018 05:28)  HR: 71 (20 Apr 2018 05:28) (67 - 78)  BP: 125/71 (20 Apr 2018 05:28) (118/64 - 125/71)  BP(mean): --  ABP: --  ABP(mean): --  RR: 18 (20 Apr 2018 05:28) (16 - 18)  SpO2: 93% (20 Apr 2018 05:28) (93% - 97%)        CAPILLARY BLOOD GLUCOSE        PHYSICAL EXAM:  GENERAL: NAD, well-developed  HEAD:  Atraumatic, Normocephalic  EYES: EOMI, PERRLA, conjunctiva and sclera clear  NECK: Supple, No JVD  CHEST/LUNG: Clear to auscultation bilaterally; +wheezing but improved since yesterday  HEART: Regular rate and rhythm; No murmurs, rubs, or gallops  ABDOMEN: Soft, Nontender, Nondistended; Bowel sounds present  EXTREMITIES:  2+ Peripheral Pulses, No clubbing, cyanosis, or edema  PSYCH: AAOx3  NEUROLOGY: non-focal  SKIN: No rashes or lesions      HOSPITAL MEDICATIONS:  MEDICATIONS  (STANDING):  ALBUTerol/ipratropium for Nebulization 3 milliLiter(s) Nebulizer every 4 hours  benzonatate 100 milliGRAM(s) Oral every 8 hours  docusate sodium 100 milliGRAM(s) Oral daily  methylPREDNISolone sodium succinate Injectable 40 milliGRAM(s) IV Push two times a day  nicotine - 21 mG/24Hr(s) Patch 1 patch Transdermal daily  senna 2 Tablet(s) Oral at bedtime    MEDICATIONS  (PRN):  ALBUTerol    90 MICROgram(s) HFA Inhaler 2 Puff(s) Inhalation every 4 hours PRN Shortness of Breath and/or Wheezing  guaiFENesin    Syrup 100 milliGRAM(s) Oral every 6 hours PRN Cough  ibuprofen  Tablet 600 milliGRAM(s) Oral four times a day PRN moderate and severe pain      LABS:                        15.1   12.46 )-----------( 170      ( 20 Apr 2018 05:30 )             44.9     04-19    141  |  106  |  18  ----------------------------<  119<H>  3.8   |  23  |  0.79    Ca    8.5      19 Apr 2018 06:08            MICROBIOLOGY:     RADIOLOGY:  [ ] Reviewed and interpreted by me    PULMONARY FUNCTION TESTS:    EKG: CHIEF COMPLAINT:  acute asthma exacerbation, SOB    Interval Events:  no acute events overnight      REVIEW OF SYSTEMS:  "feel much better"  Eyes: denies  CV: denies chest pain, palpitations  Resp:  "feels better"    GI: denies any abdominal pain  : denies any problems  MSK: full ROM  Integumentary: denies any rash   Neurological: denies any loss of memory  Psychiatric: denies  Hematologic/Lymphatic: denies any bleeding issues     [x ] All other systems negative  [ ] Unable to assess ROS because ________    OBJECTIVE:  ICU Vital Signs Last 24 Hrs  T(C): 36.9 (20 Apr 2018 05:28), Max: 36.9 (20 Apr 2018 05:28)  T(F): 98.5 (20 Apr 2018 05:28), Max: 98.5 (20 Apr 2018 05:28)  HR: 71 (20 Apr 2018 05:28) (67 - 78)  BP: 125/71 (20 Apr 2018 05:28) (118/64 - 125/71)  BP(mean): --  ABP: --  ABP(mean): --  RR: 18 (20 Apr 2018 05:28) (16 - 18)  SpO2: 93% (20 Apr 2018 05:28) (93% - 97%)        CAPILLARY BLOOD GLUCOSE        PHYSICAL EXAM:  GENERAL: NAD, well-developed  HEAD:  Atraumatic, Normocephalic  EYES: EOMI, PERRLA, conjunctiva and sclera clear  NECK: Supple, No JVD  CHEST/LUNG: Clear to auscultation bilaterally; +wheezing but improved since yesterday  HEART: Regular rate and rhythm; No murmurs, rubs, or gallops  ABDOMEN: Soft, Nontender, Nondistended; Bowel sounds present  EXTREMITIES:  2+ Peripheral Pulses, No clubbing, cyanosis, or edema  PSYCH: AAOx3  NEUROLOGY: non-focal  SKIN: No rashes or lesions      HOSPITAL MEDICATIONS:  MEDICATIONS  (STANDING):  ALBUTerol/ipratropium for Nebulization 3 milliLiter(s) Nebulizer every 4 hours  benzonatate 100 milliGRAM(s) Oral every 8 hours  docusate sodium 100 milliGRAM(s) Oral daily  methylPREDNISolone sodium succinate Injectable 40 milliGRAM(s) IV Push two times a day  nicotine - 21 mG/24Hr(s) Patch 1 patch Transdermal daily  senna 2 Tablet(s) Oral at bedtime    MEDICATIONS  (PRN):  ALBUTerol    90 MICROgram(s) HFA Inhaler 2 Puff(s) Inhalation every 4 hours PRN Shortness of Breath and/or Wheezing  guaiFENesin    Syrup 100 milliGRAM(s) Oral every 6 hours PRN Cough  ibuprofen  Tablet 600 milliGRAM(s) Oral four times a day PRN moderate and severe pain      LABS:                        15.1   12.46 )-----------( 170      ( 20 Apr 2018 05:30 )             44.9     04-19    141  |  106  |  18  ----------------------------<  119<H>  3.8   |  23  |  0.79    Ca    8.5      19 Apr 2018 06:08            MICROBIOLOGY:     RADIOLOGY:  [ ] Reviewed and interpreted by me    PULMONARY FUNCTION TESTS:    EKG:

## 2018-04-20 NOTE — PROGRESS NOTE ADULT - PROBLEM SELECTOR PLAN 2
-Duonebs q4 ATC  -prn albuterol q4h  -Medrol 40mg IV BID  -Cough suppressants  -Supplemental oxygen as needed  -Patient with no fevers/wbc count/cxr clear--no role for anti-microbials at this time -prednisone 40mg po daily x 5 days  -proventil 2 puffs q 4 hrs prn SOB/wheezing  -Advair 500/50 1 puff BID    -Cough suppressants    -Patient with no fevers/wbc count/cxr clear--no role for anti-microbials at this time

## 2018-04-20 NOTE — PROGRESS NOTE ADULT - PROBLEM SELECTOR PLAN 3
-Medrol 40mg IV changed to BID  -smoking cessation -prednisone 40mg po daily x 5 days  -smoking cessation -prednisone 40mg po daily x 5 days  -smoking cessation - continue with nicotine patch

## 2018-04-20 NOTE — PROVIDER CONTACT NOTE (OTHER) - SITUATION
Patient complains of abdominal discomfort from constipation. Patient states that experiences discomfort at times.

## 2018-04-20 NOTE — PROVIDER CONTACT NOTE (OTHER) - ASSESSMENT
Patient AOX4. Patient rubbing abdomen with no BM since 4/17/18. Patient AOX4. Patient rubbing abdomen with no BM since 4/18/18.

## 2018-04-20 NOTE — PROGRESS NOTE ADULT - ASSESSMENT
38 y/o M with PMH of asthma presented with acute exacerbation.
36 y/o M with PMH of asthma presented with acute exacerbation.

## 2018-04-20 NOTE — PROVIDER CONTACT NOTE (OTHER) - BACKGROUND
Patient's admitting diagnosis is COPD exacerbation. History of asthma, simple chronic bronchitis, etc.

## 2018-04-20 NOTE — PROGRESS NOTE ADULT - PROBLEM SELECTOR PLAN 1
-still sob, still with extensive b/l exp wheeze  -increased Duonebs q4 ATC  -Medrol 40mg IV BID  -Cough suppressants  -Supplemental oxygen as needed  -Patient with no fevers/wbc count/cxr clear--no role for anti-microbials at this time -b/l exp wheeze but improved since yesterday 4/19  -Duonebs q4 ATC  -Medrol 40mg IV BID  -Cough suppressants  -Supplemental oxygen as needed  -Patient with no fevers/wbc count/cxr clear--no role for anti-microbials at this time - clear  -Duonebs q4 ATC  -prednisone 40mg po daily x 5 days  -proventil 2 puffs q 4 hrs prn SOB/wheezing  -Advair 500/50 2 puffs BID  f/u pulm clinic in two weeks - appt set for ____  -Cough suppressants  -Supplemental oxygen as needed  -Patient with no fevers/wbc count/cxr clear--no role for anti-microbials at this time - clear  -Duonebs q4 ATC  -prednisone 40mg po daily x 5 days  -proventil 2 puffs q 4 hrs prn SOB/wheezing  -Advair 500/50 2 puffs BID  f/u pulm clinic in two weeks - appt set for ____  -Cough suppressants  -nicotine patch   -Patient with no fevers/wbc count/cxr clear--no role for anti-microbials at this time - clear  -Duonebs q4 ATC  -prednisone 40mg po daily x 5 days  -proventil 2 puffs q 4 hrs prn SOB/wheezing  -Advair 500/50 1 puffs BID  follow up pulm clinic in two weeks - appt set for  May 7th at pulm clinic 930 am   -Cough suppressants  -nicotine patch   -Patient with no fevers/wbc count/cxr clear--no role for anti-microbials at this time

## 2018-04-20 NOTE — PROGRESS NOTE ADULT - ATTENDING COMMENTS
stable on room air  still sob, still with extensive b/l exp wheeze  HMPV infection, underlying asthma with exac  increase steroids and bd frequency  smoking cessation!
pt stable for discharge  feels better, lungs clear  pred 40 for 5 days  advair 500 bid  prn alb  no smoking!  outpt pulm f/u

## 2018-05-07 ENCOUNTER — APPOINTMENT (OUTPATIENT)
Dept: PULMONOLOGY | Facility: CLINIC | Age: 38
End: 2018-05-07

## 2018-05-21 ENCOUNTER — APPOINTMENT (OUTPATIENT)
Dept: PULMONOLOGY | Facility: CLINIC | Age: 38
End: 2018-05-21

## 2019-06-05 NOTE — ED ADULT TRIAGE NOTE - NS ED NURSE BANDS TYPE
Name band; [Initial Visit] : an initial visit for [Family Member] : family member [FreeTextEntry2] : Left arm pain

## 2020-05-14 PROBLEM — J45.21 MILD INTERMITTENT ASTHMA WITH (ACUTE) EXACERBATION: Chronic | Status: ACTIVE | Noted: 2018-04-18

## 2020-05-20 ENCOUNTER — APPOINTMENT (OUTPATIENT)
Dept: UROLOGY | Facility: CLINIC | Age: 40
End: 2020-05-20

## 2020-10-28 ENCOUNTER — APPOINTMENT (OUTPATIENT)
Dept: INTERNAL MEDICINE | Facility: CLINIC | Age: 40
End: 2020-10-28

## 2020-11-04 ENCOUNTER — APPOINTMENT (OUTPATIENT)
Dept: INTERNAL MEDICINE | Facility: CLINIC | Age: 40
End: 2020-11-04
Payer: COMMERCIAL

## 2020-11-04 VITALS
HEIGHT: 68.7 IN | DIASTOLIC BLOOD PRESSURE: 73 MMHG | BODY MASS INDEX: 39.3 KG/M2 | OXYGEN SATURATION: 97 % | WEIGHT: 262.32 LBS | TEMPERATURE: 99.1 F | HEART RATE: 117 BPM | SYSTOLIC BLOOD PRESSURE: 135 MMHG

## 2020-11-04 DIAGNOSIS — M54.16 RADICULOPATHY, LUMBAR REGION: ICD-10-CM

## 2020-11-04 DIAGNOSIS — J45.901 UNSPECIFIED ASTHMA WITH (ACUTE) EXACERBATION: ICD-10-CM

## 2020-11-04 PROCEDURE — 99072 ADDL SUPL MATRL&STAF TM PHE: CPT

## 2020-11-04 PROCEDURE — 99203 OFFICE O/P NEW LOW 30 MIN: CPT | Mod: 25

## 2020-11-04 RX ORDER — TRAMADOL HYDROCHLORIDE 50 MG/1
50 TABLET, COATED ORAL
Qty: 21 | Refills: 2 | Status: ACTIVE | COMMUNITY
Start: 2020-11-04 | End: 1900-01-01

## 2020-11-04 RX ORDER — TIZANIDINE 4 MG/1
4 TABLET ORAL 3 TIMES DAILY
Qty: 30 | Refills: 0 | Status: ACTIVE | COMMUNITY
Start: 2020-11-04 | End: 1900-01-01

## 2020-11-04 RX ORDER — BLOOD-GLUCOSE METER
KIT MISCELLANEOUS
Qty: 1 | Refills: 0 | Status: ACTIVE | COMMUNITY
Start: 2020-11-04 | End: 1900-01-01

## 2020-11-04 RX ORDER — IPRATROPIUM BROMIDE AND ALBUTEROL SULFATE 2.5; .5 MG/3ML; MG/3ML
0.5-2.5 (3) SOLUTION RESPIRATORY (INHALATION) 4 TIMES DAILY
Qty: 3 | Refills: 3 | Status: ACTIVE | COMMUNITY
Start: 2020-11-04 | End: 1900-01-01

## 2020-11-04 RX ORDER — ALBUTEROL SULFATE 90 UG/1
108 (90 BASE) INHALANT RESPIRATORY (INHALATION)
Qty: 1 | Refills: 3 | Status: ACTIVE | COMMUNITY
Start: 2020-11-04 | End: 1900-01-01

## 2020-11-04 RX ORDER — PREDNISONE 20 MG/1
20 TABLET ORAL DAILY
Qty: 15 | Refills: 0 | Status: ACTIVE | COMMUNITY
Start: 2020-11-04 | End: 1900-01-01

## 2020-11-08 NOTE — HISTORY OF PRESENT ILLNESS
[FreeTextEntry8] : Patient presents to the office due to back pain and asthma exacerbation. Was previously diagnosed with asthma, has cough and wheezing. Went to ED and received dose of medrol, signed out against Medical advise. Has back pain, radiating down left leg. Occurred a few days ago. Had MRI which showed radiculopathy. No weakness gait instability, or urinary incontinence.

## 2020-11-08 NOTE — PHYSICAL EXAM
[Normal] : normal gait, coordination grossly intact, no focal deficits and deep tendon reflexes were 2+ and symmetric [de-identified] : wheezinf b/l [de-identified] : tenderness left lower back,

## 2020-11-08 NOTE — ASSESSMENT
[FreeTextEntry1] : To treat with prednisone and duoneb for asthma exacerbation. Also advised to see pulm for PFT's and maintenance inhalers. Tramadol given for pain medication. Smoking cessation advised. Call office if no improvement in symptoms in  48 hours. If worsening to go to ED.

## 2021-01-10 ENCOUNTER — OUTPATIENT (OUTPATIENT)
Dept: OUTPATIENT SERVICES | Facility: HOSPITAL | Age: 41
LOS: 1 days | End: 2021-01-10
Payer: COMMERCIAL

## 2021-01-10 DIAGNOSIS — Z20.828 CONTACT WITH AND (SUSPECTED) EXPOSURE TO OTHER VIRAL COMMUNICABLE DISEASES: ICD-10-CM

## 2021-01-10 DIAGNOSIS — S83.206A UNSPECIFIED TEAR OF UNSPECIFIED MENISCUS, CURRENT INJURY, RIGHT KNEE, INITIAL ENCOUNTER: Chronic | ICD-10-CM

## 2021-01-10 PROCEDURE — U0003: CPT

## 2021-01-10 PROCEDURE — U0005: CPT

## 2021-01-10 PROCEDURE — C9803: CPT

## 2021-01-11 DIAGNOSIS — Z20.828 CONTACT WITH AND (SUSPECTED) EXPOSURE TO OTHER VIRAL COMMUNICABLE DISEASES: ICD-10-CM

## 2021-01-11 LAB — SARS-COV-2 RNA SPEC QL NAA+PROBE: SIGNIFICANT CHANGE UP

## 2021-11-06 NOTE — ED CDU PROVIDER SUBSEQUENT DAY NOTE - TEMPLATE, MLM
Problem: Skin Integrity:  Goal: Will show no infection signs and symptoms  Description: Will show no infection signs and symptoms  Outcome: Ongoing  Goal: Absence of new skin breakdown  Description: Absence of new skin breakdown  Outcome: Ongoing     Problem: Falls - Risk of:  Goal: Will remain free from falls  Description: Will remain free from falls  Outcome: Ongoing  Goal: Absence of physical injury  Description: Absence of physical injury  Outcome: Ongoing     Problem: Nutrition  Goal: Optimal nutrition therapy  Outcome: Ongoing     Problem: Pain:  Goal: Pain level will decrease  Description: Pain level will decrease  Outcome: Ongoing  Goal: Control of acute pain  Description: Control of acute pain  Outcome: Ongoing  Goal: Control of chronic pain  Description: Control of chronic pain  Outcome: Ongoing   Electronically signed by Talib Singh RN on 11/6/2021 at 4:38 AM Respiratory

## 2022-05-24 NOTE — DOWNTIME INTERRUPTION NOTE - TIME SYSTEM UNAVAILABLE
06-Jul-2017 07:50 Winlevi Counseling:  I discussed with the patient the risks of topical clascoterone including but not limited to erythema, scaling, itching, and stinging. Patient voiced their understanding.